# Patient Record
Sex: FEMALE | Race: OTHER | Employment: UNEMPLOYED | URBAN - METROPOLITAN AREA
[De-identification: names, ages, dates, MRNs, and addresses within clinical notes are randomized per-mention and may not be internally consistent; named-entity substitution may affect disease eponyms.]

---

## 2021-11-03 ENCOUNTER — HOSPITAL ENCOUNTER (OUTPATIENT)
Dept: LAB | Age: 41
Discharge: HOME | End: 2021-11-03

## 2021-11-03 ENCOUNTER — OFFICE VISIT (OUTPATIENT)
Dept: FAMILY MEDICINE CLINIC | Age: 41
End: 2021-11-03

## 2021-11-03 VITALS
BODY MASS INDEX: 52.14 KG/M2 | SYSTOLIC BLOOD PRESSURE: 133 MMHG | HEART RATE: 90 BPM | OXYGEN SATURATION: 98 % | WEIGHT: 265.6 LBS | TEMPERATURE: 98.4 F | DIASTOLIC BLOOD PRESSURE: 81 MMHG | RESPIRATION RATE: 17 BRPM | HEIGHT: 60 IN

## 2021-11-03 DIAGNOSIS — R20.2 NUMBNESS AND TINGLING: ICD-10-CM

## 2021-11-03 DIAGNOSIS — M25.561 CHRONIC PAIN OF BOTH KNEES: ICD-10-CM

## 2021-11-03 DIAGNOSIS — G89.29 CHRONIC PAIN OF BOTH KNEES: ICD-10-CM

## 2021-11-03 DIAGNOSIS — K21.9 GASTROESOPHAGEAL REFLUX DISEASE, UNSPECIFIED WHETHER ESOPHAGITIS PRESENT: ICD-10-CM

## 2021-11-03 DIAGNOSIS — R20.0 NUMBNESS AND TINGLING: ICD-10-CM

## 2021-11-03 DIAGNOSIS — R11.0 NAUSEA: Primary | ICD-10-CM

## 2021-11-03 DIAGNOSIS — R35.0 FREQUENCY OF URINATION: ICD-10-CM

## 2021-11-03 DIAGNOSIS — M25.562 CHRONIC PAIN OF BOTH KNEES: ICD-10-CM

## 2021-11-03 LAB
ALBUMIN SERPL-MCNC: 3.6 G/DL (ref 3.5–5)
ALBUMIN/GLOB SERPL: 0.8 {RATIO} (ref 1.1–2.2)
ALP SERPL-CCNC: 110 U/L (ref 45–117)
ALT SERPL-CCNC: 27 U/L (ref 12–78)
ANION GAP SERPL CALC-SCNC: 7 MMOL/L (ref 5–15)
AST SERPL-CCNC: 8 U/L (ref 15–37)
BILIRUB SERPL-MCNC: 0.3 MG/DL (ref 0.2–1)
BILIRUB UR QL STRIP: NEGATIVE
BUN SERPL-MCNC: 12 MG/DL (ref 6–20)
BUN/CREAT SERPL: 18 (ref 12–20)
CALCIUM SERPL-MCNC: 9.2 MG/DL (ref 8.5–10.1)
CHLORIDE SERPL-SCNC: 110 MMOL/L (ref 97–108)
CHOLEST SERPL-MCNC: 156 MG/DL
CO2 SERPL-SCNC: 25 MMOL/L (ref 21–32)
CREAT SERPL-MCNC: 0.68 MG/DL (ref 0.55–1.02)
EST. AVERAGE GLUCOSE BLD GHB EST-MCNC: 120 MG/DL
GLOBULIN SER CALC-MCNC: 4.3 G/DL (ref 2–4)
GLUCOSE SERPL-MCNC: 125 MG/DL (ref 65–100)
GLUCOSE UR-MCNC: NEGATIVE MG/DL
HBA1C MFR BLD: 5.8 % (ref 4–5.6)
HCG URINE, QL. (POC): NEGATIVE
HDLC SERPL-MCNC: 43 MG/DL
HDLC SERPL: 3.6 {RATIO} (ref 0–5)
KETONES P FAST UR STRIP-MCNC: NEGATIVE MG/DL
LDLC SERPL CALC-MCNC: 77.4 MG/DL (ref 0–100)
PH UR STRIP: 5.5 [PH] (ref 4.6–8)
POTASSIUM SERPL-SCNC: 4 MMOL/L (ref 3.5–5.1)
PROT SERPL-MCNC: 7.9 G/DL (ref 6.4–8.2)
PROT UR QL STRIP: NEGATIVE
SODIUM SERPL-SCNC: 142 MMOL/L (ref 136–145)
SP GR UR STRIP: 1.03 (ref 1–1.03)
TRIGL SERPL-MCNC: 178 MG/DL (ref ?–150)
TSH SERPL DL<=0.05 MIU/L-ACNC: 1.41 UIU/ML (ref 0.36–3.74)
UA UROBILINOGEN AMB POC: NORMAL (ref 0.2–1)
URINALYSIS CLARITY POC: CLEAR
URINALYSIS COLOR POC: YELLOW
URINE BLOOD POC: NORMAL
URINE LEUKOCYTES POC: NEGATIVE
URINE NITRITES POC: NEGATIVE
VALID INTERNAL CONTROL?: YES
VLDLC SERPL CALC-MCNC: 35.6 MG/DL

## 2021-11-03 PROCEDURE — 81001 URINALYSIS AUTO W/SCOPE: CPT | Performed by: FAMILY MEDICINE

## 2021-11-03 PROCEDURE — 84443 ASSAY THYROID STIM HORMONE: CPT

## 2021-11-03 PROCEDURE — 99203 OFFICE O/P NEW LOW 30 MIN: CPT | Performed by: FAMILY MEDICINE

## 2021-11-03 PROCEDURE — 83036 HEMOGLOBIN GLYCOSYLATED A1C: CPT

## 2021-11-03 PROCEDURE — 80061 LIPID PANEL: CPT

## 2021-11-03 PROCEDURE — 81025 URINE PREGNANCY TEST: CPT | Performed by: FAMILY MEDICINE

## 2021-11-03 PROCEDURE — 80053 COMPREHEN METABOLIC PANEL: CPT

## 2021-11-03 RX ORDER — ETONOGESTREL 68 MG/1
IMPLANT SUBCUTANEOUS
COMMUNITY

## 2021-11-03 RX ORDER — PANTOPRAZOLE SODIUM 40 MG/1
40 TABLET, DELAYED RELEASE ORAL DAILY
Qty: 30 TABLET | Refills: 1 | Status: SHIPPED | OUTPATIENT
Start: 2021-11-03 | End: 2022-06-23 | Stop reason: SDUPTHER

## 2021-11-03 RX ORDER — CELECOXIB 200 MG/1
200 CAPSULE ORAL 2 TIMES DAILY
Qty: 60 CAPSULE | Refills: 1 | Status: SHIPPED | OUTPATIENT
Start: 2021-11-03 | End: 2022-06-23

## 2021-11-03 NOTE — PROGRESS NOTES
An After Visit Summary was printed and given to the patient. GoodRx provided to patient for needed medications. Instructed patient on how to use the coupon/card. Patient did not want the Flu shot at this time. April 468 Sara Bolanos, RN

## 2021-11-03 NOTE — PROGRESS NOTES
Cliff Asif is a 38 y.o. female  Chief Complaint   Patient presents with   [employer] Establish Care    Numbness     on both hands    Nausea     Blood pressure 133/81, pulse 90, temperature 98.4 °F (36.9 °C), temperature source Temporal, resp. rate 17, height 4' 11.69\" (1.516 m), weight 265 lb 9.6 oz (120.5 kg), last menstrual period 09/21/2021, SpO2 98 %. 1. Have you been to the ER, urgent care clinic since your last visit? Hospitalized since your last visit? No    2. Have you seen or consulted any other health care providers outside of the [address] [place] [address] since your last visit? Include any pap smears or colon screening.  No

## 2021-11-03 NOTE — PROGRESS NOTES
HISTORY OF PRESENT ILLNESS  Mirlande Neri is a 38 y.o. female. HPI  Patient states her hands become numb, also her knees,  She has leg pains and can't climb stairs. Also has epigastric pain and nausea. In August she had gallbladder surgery,  It was an emergency surgery. She had a drain for 3 weeks. It eventualy healed. She is unaware if she has problems with sugar levels. She used to weight 240 last year. 265 now. She states she used depo provera and thinks this is the reason why she gain weight. She has some gastric reflux as well. Review of Systems   Constitutional: Negative for chills, malaise/fatigue and weight loss. Respiratory: Negative for cough, shortness of breath and wheezing. Cardiovascular: Negative for chest pain, palpitations and orthopnea. Gastrointestinal: Positive for abdominal pain and nausea. Genitourinary: Negative for dysuria, frequency and urgency. Musculoskeletal: Positive for joint pain. Physical Exam  Constitutional:       General: She is not in acute distress. HENT:      Head: Normocephalic. Right Ear: Tympanic membrane normal.      Left Ear: Tympanic membrane normal.   Cardiovascular:      Rate and Rhythm: Normal rate and regular rhythm. Pulses: Normal pulses. Heart sounds: Normal heart sounds. No murmur heard. Pulmonary:      Effort: Pulmonary effort is normal.      Breath sounds: Normal breath sounds. No wheezing, rhonchi or rales. Abdominal:      General: Bowel sounds are normal. There is no distension. Palpations: Abdomen is soft. Tenderness: There is no abdominal tenderness. Musculoskeletal:         General: Tenderness present. Cervical back: Normal range of motion. No rigidity. Comments: Bilateral anterior knee pain to palpation and ROM   Neurological:      Mental Status: She is alert. ASSESSMENT and PLAN  Diagnoses and all orders for this visit:    1.  Nausea  -     AMB POC URINE PREGNANCY TEST, VISUAL COLOR COMPARISON    2. Frequency of urination  -     AMB POC URINALYSIS DIP STICK AUTO W/ MICRO    3. Numbness and tingling  -     CBC WITH AUTOMATED DIFF; Future  -     METABOLIC PANEL, COMPREHENSIVE; Future  -     HEMOGLOBIN A1C WITH EAG; Future  -     LIPID PANEL; Future  -     TSH 3RD GENERATION; Future    4. Chronic pain of both knees  -     celecoxib (CELEBREX) 200 mg capsule; Take 1 Capsule by mouth two (2) times a day. 5. Gastroesophageal reflux disease, unspecified whether esophagitis present  -     pantoprazole (PROTONIX) 40 mg tablet; Take 1 Tablet by mouth daily. 38year old with numbness, tingling, joint pains, gastric reflux,  She also mentions she has gained over 15 pounds in the past year.   We will check labs today  Topical nsaid offered for knee pain, she prefers oral   GERD symptoms, we will write for as needed pantoprazole

## 2021-11-03 NOTE — PROGRESS NOTES
Results for orders placed or performed in visit on 11/03/21   AMB POC URINE PREGNANCY TEST, VISUAL COLOR COMPARISON   Result Value Ref Range    VALID INTERNAL CONTROL POC Yes     HCG urine, Ql. (POC) Negative Negative   AMB POC URINALYSIS DIP STICK AUTO W/ MICRO   Result Value Ref Range    Color (UA POC) Yellow     Clarity (UA POC) Clear     Glucose (UA POC) Negative Negative    Bilirubin (UA POC) Negative Negative    Ketones (UA POC) Negative Negative    Specific gravity (UA POC) 1.030 1.001 - 1.035    Blood (UA POC) Trace Negative    pH (UA POC) 5.5 4.6 - 8.0    Protein (UA POC) Negative Negative    Urobilinogen (UA POC) 0.2 mg/dL 0.2 - 1    Nitrites (UA POC) Negative Negative    Leukocyte esterase (UA POC) Negative Negative

## 2021-11-04 NOTE — PROGRESS NOTES
Labs show prediabetes,  She needs to eliminate sugars from her diet,  increase physical activity  Eat healthy  Cholesterol was normal, normal kidney function, liver function and electrolytes, normal thyroid  Patient can be informed

## 2021-11-26 NOTE — PROGRESS NOTES
CMA made t/c to patient to review lab results per provider instructions, name and  were verified with patient. CMA  informed patient that Labs show prediabetes,   She needs to eliminate sugars from her diet,  increase physical activity   Eat healthy   Cholesterol was normal, normal kidney function, liver function and electrolytes, normal thyroid   Patient can be informed. CMA reminded patient of upcoming appointment at [facility]. This has been fully explained to the patient, who indicates understanding.

## 2022-02-08 ENCOUNTER — OFFICE VISIT (OUTPATIENT)
Dept: FAMILY MEDICINE CLINIC | Age: 42
End: 2022-02-08

## 2022-02-08 VITALS
BODY MASS INDEX: 55.68 KG/M2 | HEIGHT: 59 IN | WEIGHT: 276.2 LBS | SYSTOLIC BLOOD PRESSURE: 136 MMHG | RESPIRATION RATE: 17 BRPM | HEART RATE: 94 BPM | OXYGEN SATURATION: 96 % | TEMPERATURE: 97.4 F | DIASTOLIC BLOOD PRESSURE: 96 MMHG

## 2022-02-08 DIAGNOSIS — R73.03 PREDIABETES: Primary | ICD-10-CM

## 2022-02-08 DIAGNOSIS — R03.0 ELEVATED BP WITHOUT DIAGNOSIS OF HYPERTENSION: ICD-10-CM

## 2022-02-08 LAB — GLUCOSE POC: NORMAL MG/DL

## 2022-02-08 PROCEDURE — 99213 OFFICE O/P EST LOW 20 MIN: CPT | Performed by: FAMILY MEDICINE

## 2022-02-08 PROCEDURE — 82962 GLUCOSE BLOOD TEST: CPT | Performed by: FAMILY MEDICINE

## 2022-02-08 NOTE — PROGRESS NOTES
Choco Arevalo is a 42 y.o. female  Chief Complaint   Patient presents with    Diabetes    Fatigue     and SOB. Blood pressure (!) 136/96, pulse 94, temperature 97.4 °F (36.3 °C), temperature source Temporal, resp. rate 17, height 4' 11\" (1.499 m), weight 276 lb 3.2 oz (125.3 kg), SpO2 96 %. 1. Have you been to the ER, urgent care clinic since your last visit? Hospitalized since your last visit? No    2. Have you seen or consulted any other health care providers outside of the [address] [place] [address] since your last visit? Include any pap smears or colon screening.  No   Results for orders placed or performed in visit on 02/08/22   AMB POC GLUCOSE BLOOD, BY GLUCOSE MONITORING DEVICE   Result Value Ref Range    Glucose -NF MG/DL

## 2022-02-08 NOTE — PROGRESS NOTES
Félix Holder is a 42 y.o. female   Chief Complaint   Patient presents with    Diabetes    Fatigue     and SOB. ASSESSMENT AND PLAN:    1. Prediabetes  NFBG 147 today. Pt has not made dietary changes. She will renew her efforts. Will follow up in 6 weeks and check A1C then. Do not expect to see a significant change in A1C if we were to check again today. - AMB POC GLUCOSE BLOOD, BY GLUCOSE MONITORING DEVICE    2. Elevated BP. Discussed with patient. Another reason to implement lifestyle modifications. Patient is in agreement. F/U in 6 weeks. If elevated, will consider starting medication. SUBJECTIVE:    HPI:  Anna Zhao is a 42 y.o. female who presents for follow up on prediabetes. She was planning on changing her diet and starting to implement more physical activity but she has not. She states that she had a nexplanon placed to prevent pregnancy and it has increased her appetite and all she wants to do is eat. She would like surgery to prevent pregnancy because she has had similar issues and more with the pill and depo. Also the nexplanon has made her tired and emotional.   She has made one change, though-- she has stopped putting sugar in her coffee. Review of Systems   Constitutional: Negative for fever and malaise/fatigue. Eyes: Negative for blurred vision. Respiratory: Negative for cough and shortness of breath. Cardiovascular: Negative for chest pain, palpitations and leg swelling. Gastrointestinal: Negative for abdominal pain, constipation, diarrhea, nausea and vomiting. Neurological: Negative for dizziness and headaches.          BP (!) 136/96 (BP 1 Location: Right upper arm, BP Patient Position: Sitting, BP Cuff Size: Adult long)   Pulse 94   Temp 97.4 °F (36.3 °C) (Temporal)   Resp 17   Ht 4' 11\" (1.499 m)   Wt 276 lb 3.2 oz (125.3 kg)   SpO2 96%   BMI 55.79 kg/m²     Physical Exam  Constitutional:       General: She is not in acute distress. Appearance: Normal appearance. Cardiovascular:      Rate and Rhythm: Normal rate and regular rhythm. Pulses: Normal pulses. Heart sounds: Normal heart sounds. Pulmonary:      Effort: Pulmonary effort is normal.      Breath sounds: Normal breath sounds. Abdominal:      General: Bowel sounds are normal. There is no distension. Palpations: Abdomen is soft. Tenderness: There is no abdominal tenderness. Musculoskeletal:      Cervical back: No tenderness. Lymphadenopathy:      Cervical: No cervical adenopathy. Neurological:      Mental Status: She is alert and oriented to person, place, and time.       Gait: Gait normal.      Deep Tendon Reflexes: Reflexes normal.   Psychiatric:         Behavior: Behavior normal.

## 2022-06-23 ENCOUNTER — HOSPITAL ENCOUNTER (OUTPATIENT)
Dept: LAB | Age: 42
Discharge: HOME | End: 2022-06-23

## 2022-06-23 ENCOUNTER — OFFICE VISIT (OUTPATIENT)
Dept: FAMILY MEDICINE CLINIC | Age: 42
End: 2022-06-23

## 2022-06-23 VITALS
HEART RATE: 77 BPM | WEIGHT: 288 LBS | OXYGEN SATURATION: 95 % | BODY MASS INDEX: 58.06 KG/M2 | TEMPERATURE: 97.5 F | SYSTOLIC BLOOD PRESSURE: 159 MMHG | HEIGHT: 59 IN | DIASTOLIC BLOOD PRESSURE: 107 MMHG

## 2022-06-23 DIAGNOSIS — K21.9 GASTROESOPHAGEAL REFLUX DISEASE, UNSPECIFIED WHETHER ESOPHAGITIS PRESENT: ICD-10-CM

## 2022-06-23 DIAGNOSIS — Z23 ENCOUNTER FOR IMMUNIZATION: Primary | ICD-10-CM

## 2022-06-23 DIAGNOSIS — R73.03 PREDIABETES: ICD-10-CM

## 2022-06-23 DIAGNOSIS — I10 HYPERTENSION, UNSPECIFIED TYPE: ICD-10-CM

## 2022-06-23 LAB
ALBUMIN SERPL-MCNC: 4.1 G/DL (ref 3.5–5)
ALBUMIN/GLOB SERPL: 1 {RATIO} (ref 1.1–2.2)
ALP SERPL-CCNC: 128 U/L (ref 45–117)
ALT SERPL-CCNC: 37 U/L (ref 12–78)
ANION GAP SERPL CALC-SCNC: 5 MMOL/L (ref 5–15)
AST SERPL-CCNC: 15 U/L (ref 15–37)
BASOPHILS # BLD: 0.1 K/UL (ref 0–0.1)
BASOPHILS NFR BLD: 1 % (ref 0–1)
BILIRUB SERPL-MCNC: 0.3 MG/DL (ref 0.2–1)
BUN SERPL-MCNC: 14 MG/DL (ref 6–20)
BUN/CREAT SERPL: 19 (ref 12–20)
CALCIUM SERPL-MCNC: 9.4 MG/DL (ref 8.5–10.1)
CHLORIDE SERPL-SCNC: 108 MMOL/L (ref 97–108)
CO2 SERPL-SCNC: 29 MMOL/L (ref 21–32)
CREAT SERPL-MCNC: 0.73 MG/DL (ref 0.55–1.02)
DIFFERENTIAL METHOD BLD: ABNORMAL
EOSINOPHIL # BLD: 0.2 K/UL (ref 0–0.4)
EOSINOPHIL NFR BLD: 2 % (ref 0–7)
ERYTHROCYTE [DISTWIDTH] IN BLOOD BY AUTOMATED COUNT: 14.1 % (ref 11.5–14.5)
EST. AVERAGE GLUCOSE BLD GHB EST-MCNC: 123 MG/DL
GLOBULIN SER CALC-MCNC: 4 G/DL (ref 2–4)
GLUCOSE SERPL-MCNC: 83 MG/DL (ref 65–100)
HBA1C MFR BLD: 5.9 % (ref 4–5.6)
HCT VFR BLD AUTO: 44.9 % (ref 35–47)
HGB BLD-MCNC: 14.2 G/DL (ref 11.5–16)
IMM GRANULOCYTES # BLD AUTO: 0 K/UL (ref 0–0.04)
IMM GRANULOCYTES NFR BLD AUTO: 0 % (ref 0–0.5)
LYMPHOCYTES # BLD: 4.1 K/UL (ref 0.8–3.5)
LYMPHOCYTES NFR BLD: 37 % (ref 12–49)
MCH RBC QN AUTO: 29.2 PG (ref 26–34)
MCHC RBC AUTO-ENTMCNC: 31.6 G/DL (ref 30–36.5)
MCV RBC AUTO: 92.2 FL (ref 80–99)
MONOCYTES # BLD: 0.7 K/UL (ref 0–1)
MONOCYTES NFR BLD: 6 % (ref 5–13)
NEUTS SEG # BLD: 6 K/UL (ref 1.8–8)
NEUTS SEG NFR BLD: 54 % (ref 32–75)
NRBC # BLD: 0 K/UL (ref 0–0.01)
NRBC BLD-RTO: 0 PER 100 WBC
PLATELET # BLD AUTO: 260 K/UL (ref 150–400)
PMV BLD AUTO: 10.5 FL (ref 8.9–12.9)
POTASSIUM SERPL-SCNC: 4.1 MMOL/L (ref 3.5–5.1)
PROT SERPL-MCNC: 8.1 G/DL (ref 6.4–8.2)
RBC # BLD AUTO: 4.87 M/UL (ref 3.8–5.2)
SODIUM SERPL-SCNC: 142 MMOL/L (ref 136–145)
WBC # BLD AUTO: 11 K/UL (ref 3.6–11)

## 2022-06-23 PROCEDURE — 99213 OFFICE O/P EST LOW 20 MIN: CPT | Performed by: FAMILY MEDICINE

## 2022-06-23 PROCEDURE — 0054A COVID-19, PFIZER GRAY TOP, DO NOT DILUTE, TRIS-SUCROSE, (AGE 12 YRS+), PF, 30MCG/0.3 ML: CPT

## 2022-06-23 PROCEDURE — 83036 HEMOGLOBIN GLYCOSYLATED A1C: CPT

## 2022-06-23 PROCEDURE — 80053 COMPREHEN METABOLIC PANEL: CPT

## 2022-06-23 PROCEDURE — 85025 COMPLETE CBC W/AUTO DIFF WBC: CPT

## 2022-06-23 PROCEDURE — 91305 COVID-19, PFIZER GRAY TOP, DO NOT DILUTE, TRIS-SUCROSE, (AGE 12 YRS+), PF, 30MCG/0.3 ML: CPT

## 2022-06-23 RX ORDER — PANTOPRAZOLE SODIUM 40 MG/1
40 TABLET, DELAYED RELEASE ORAL DAILY
Qty: 90 TABLET | Refills: 2 | Status: SHIPPED | OUTPATIENT
Start: 2022-06-23

## 2022-06-23 RX ORDER — HYDROCHLOROTHIAZIDE 12.5 MG/1
12.5 TABLET ORAL DAILY
Qty: 90 TABLET | Refills: 2 | Status: SHIPPED | OUTPATIENT
Start: 2022-06-23

## 2022-06-23 NOTE — PROGRESS NOTES
An After Visit Summary was printed and given to the patient. GoodRx provided to patient for needed medications. Instructed patient on how to use the coupon/card. Covid-19 vaccine was administered with consent. VIS was given before administration. Patient waited 15 minutes after administration. No reaction noted.

## 2022-06-23 NOTE — PROGRESS NOTES
HISTORY OF PRESENT ILLNESS  Jenifer Montgomery is a 42 y.o. female. HPI  Patient states she had her gallbladder removed last year. She has noticed she is waking up in the middle of the night with some reflux, has noticed some blood in the sputum along with phlegm. She was told she was prediabetic the last time she was seen. She has tried to avoid soda. Review of Systems   Constitutional: Negative for chills, malaise/fatigue and weight loss. Respiratory: Negative for cough, shortness of breath and wheezing. Cardiovascular: Negative for chest pain, palpitations and orthopnea. Gastrointestinal: Positive for heartburn. Negative for abdominal pain and nausea. Genitourinary: Negative for dysuria, frequency and urgency. Musculoskeletal: Negative for joint pain. BP (!) 159/107 (BP 1 Location: Left upper arm, BP Patient Position: Sitting)   Pulse 77   Temp 97.5 °F (36.4 °C) (Temporal)   Ht 4' 11.02\" (1.499 m)   Wt 288 lb (130.6 kg)   SpO2 95%   BMI 58.14 kg/m²   Physical Exam  Constitutional:       General: She is not in acute distress. HENT:      Head: Normocephalic. Right Ear: Tympanic membrane normal.      Left Ear: Tympanic membrane normal.   Cardiovascular:      Rate and Rhythm: Normal rate and regular rhythm. Pulses: Normal pulses. Heart sounds: Normal heart sounds. No murmur heard. Pulmonary:      Effort: Pulmonary effort is normal.      Breath sounds: Normal breath sounds. No wheezing, rhonchi or rales. Abdominal:      General: Bowel sounds are normal. There is no distension. Palpations: Abdomen is soft. Tenderness: There is no abdominal tenderness. Musculoskeletal:         General: No tenderness. Cervical back: Normal range of motion. No rigidity. Neurological:      Mental Status: She is alert. ASSESSMENT and PLAN  Diagnoses and all orders for this visit:    1.  Hypertension, unspecified type  -     hydroCHLOROthiazide (HYDRODIURIL) 12.5 mg tablet; Take 1 Tablet by mouth daily. 2. Gastroesophageal reflux disease, unspecified whether esophagitis present  -     pantoprazole (PROTONIX) 40 mg tablet; Take 1 Tablet by mouth daily.  -     CBC WITH AUTOMATED DIFF; Future  -     METABOLIC PANEL, COMPREHENSIVE; Future    3. Prediabetes  -     HEMOGLOBIN A1C WITH EAG;  Future      42year old female with hypertension, and GERD symptoms  Prior labs from last year showed prediabetes  We will check labs today  Start hctz  Pantoprazole for GERD  Follow up in 1 month

## 2022-06-23 NOTE — PROGRESS NOTES
Coordination of Care  1. Have you been to the ER, urgent care clinic since your last visit? Hospitalized since your last visit? No    2. Have you seen or consulted any other health care providers outside of the [address] [place] [address] since your last visit? Include any pap smears or colon screening. No    Does the patient need refills? NO    Learning Assessment Complete?  yes  Depression Screening complete in the past 12 months? yes

## 2022-06-27 NOTE — PROGRESS NOTES
Hemoglobin a1c remains at risk of diabetes at 5.9%  Normal kidney function, liver function, electrolytes, blood count  Patient can be informed

## 2022-07-11 NOTE — PROGRESS NOTES
Attempted to call the patient several times. No answer.  RAUL printed and mailed a lab results letters to the patient's mailing address on file

## 2022-07-13 ENCOUNTER — TELEPHONE (OUTPATIENT)
Dept: FAMILY MEDICINE CLINIC | Age: 42
End: 2022-07-13

## 2022-07-13 NOTE — TELEPHONE ENCOUNTER
Tc to the pt to return her call. She had stated she had several missed calls from the [facility]. The pt had previously been called by the [facility] to give her the lab results. She did not answer so the pt was mailed the results in the mail. The pt was called back. She answered. AMN Int # [id number]. The pt verified her name and . The pt was given her lab results message from the provider and I reviewed ways to decrease the Hgb A1c with diet and exercise. The pt verbalized understanding. She was told she would be receiving the lab results message in the mail with the providers message on it to her.  Deandre Stone RN

## 2022-07-13 NOTE — TELEPHONE ENCOUNTER
Patient called the main office back. States she had some missed calls from the [address] [place] [address]  It looks like based on some notes they called her for lab results.

## 2022-08-25 ENCOUNTER — OFFICE VISIT (OUTPATIENT)
Dept: FAMILY MEDICINE CLINIC | Age: 42
End: 2022-08-25

## 2022-08-25 VITALS
DIASTOLIC BLOOD PRESSURE: 78 MMHG | TEMPERATURE: 98.2 F | OXYGEN SATURATION: 97 % | HEART RATE: 90 BPM | BODY MASS INDEX: 58.87 KG/M2 | HEIGHT: 59 IN | SYSTOLIC BLOOD PRESSURE: 122 MMHG | WEIGHT: 292 LBS

## 2022-08-25 DIAGNOSIS — K29.51 CHRONIC GASTRITIS WITH BLEEDING, UNSPECIFIED GASTRITIS TYPE: Primary | ICD-10-CM

## 2022-08-25 DIAGNOSIS — M25.561 CHRONIC PAIN OF BOTH KNEES: ICD-10-CM

## 2022-08-25 DIAGNOSIS — I10 HYPERTENSION, UNSPECIFIED TYPE: ICD-10-CM

## 2022-08-25 DIAGNOSIS — M25.562 CHRONIC PAIN OF BOTH KNEES: ICD-10-CM

## 2022-08-25 DIAGNOSIS — G89.29 CHRONIC PAIN OF BOTH KNEES: ICD-10-CM

## 2022-08-25 PROCEDURE — 99213 OFFICE O/P EST LOW 20 MIN: CPT | Performed by: FAMILY MEDICINE

## 2022-08-25 RX ORDER — DICLOFENAC SODIUM 10 MG/G
4 GEL TOPICAL 3 TIMES DAILY
Qty: 100 G | Refills: 3 | Status: SHIPPED | OUTPATIENT
Start: 2022-08-25

## 2022-08-25 NOTE — PROGRESS NOTES
HISTORY OF PRESENT ILLNESS  Carlos Matos is a 42 y.o. female. HPI  Patient states she continues to have abdominal pain. She states she has blood in the sputum. She states she has abdominal pains. States she wakes up in the middle of the night and presents with blood in the sputum. Patient states she has had problems with her knee joints for about 1 year,  it has gotten worse lately. Review of Systems   Constitutional:  Negative for chills, malaise/fatigue and weight loss. Respiratory:  Negative for cough, shortness of breath and wheezing. Cardiovascular:  Negative for chest pain, palpitations and orthopnea. Gastrointestinal:  Positive for heartburn. Negative for abdominal pain and nausea. Blood in sputum   Genitourinary:  Negative for dysuria, frequency and urgency. Musculoskeletal:  Positive for joint pain. Bilateral knee joint pain   /78 (BP 1 Location: Left upper arm, BP Patient Position: Sitting)   Pulse 90   Temp 98.2 °F (36.8 °C) (Temporal)   Ht 4' 11.02\" (1.499 m)   Wt 292 lb (132.5 kg)   LMP 08/23/2022   SpO2 97%   BMI 58.94 kg/m²   Physical Exam  Constitutional:       General: She is not in acute distress. HENT:      Head: Normocephalic. Right Ear: Tympanic membrane normal.      Left Ear: Tympanic membrane normal.   Cardiovascular:      Rate and Rhythm: Normal rate and regular rhythm. Pulses: Normal pulses. Heart sounds: Normal heart sounds. No murmur heard. Pulmonary:      Effort: Pulmonary effort is normal.      Breath sounds: Normal breath sounds. No wheezing, rhonchi or rales. Abdominal:      General: Bowel sounds are normal. There is no distension. Palpations: Abdomen is soft. Tenderness: There is no abdominal tenderness. Musculoskeletal:         General: Tenderness and deformity present. Cervical back: Normal range of motion. No rigidity.       Comments: Bilateral knee joint pain to ROM   Neurological: Mental Status: She is alert. ASSESSMENT and PLAN  Diagnoses and all orders for this visit:    1. Chronic gastritis with bleeding, unspecified gastritis type  -     REFERRAL TO GASTROENTEROLOGY  -     H PYLORI AG, STOOL; Future    2. Hypertension, unspecified type    3. Chronic pain of both knees  -     diclofenac (VOLTAREN) 1 % gel; Apply 4 g to affected area three (3) times daily. Both knees  -     XR KNEE RT MAX 2 VWS; Future  -     XR KNEE LT MAX 2 VWS; Future    42year old female with chronic gastritis, we will refer to GI, check h.  Pylori  Chronic knee joint pain,  we will add voltaren gel,  weight loss recommended, will get x rays of knees  Follow up in 2 weeks for possible joint injection

## 2022-08-25 NOTE — PROGRESS NOTES
I have printed AVS and reviewed it with patient today. Patient verbalized understanding. .I reviewed with patient medications sent to pharmacy and how the medication is taken. Patient verbalized understanding. The patient was texted coupons for prescriptions and I explained how the coupons are used. Patient verbalized understanding. I explained procedure for obtaining xray as a walk-in and instructed the patient to go to Outpatient Registration. The patient selected [address] [place] [address].I provided address and directions to facility. I explained to the patient that someone will call them after the results are available. Patient verbalized understanding. I explained the process for Access Now to the patient and explained to the patient that she will receive a call to start the Access Now process. I instructed patient to schedule a follow-up appointment prior to leaving today. I explained the process for obtaining the HPylori test and gave the patient the container. I explained to the patient to return the container within 24 hours and to refrigerate the specimen after collecting. Patient verbalized understanding. Patient correctly stated her full name and date of birth prior to the information shared.  [place] with the [place] [id number] assisted with this discharge.   Doe Reeves RN

## 2022-08-25 NOTE — PROGRESS NOTES
Elvira Benites  1980  Chief Complaint   Patient presents with    Hypertension     And GI problem f/up    Knee Pain     Pt c/o bilateral knee pain. Pt took 3 Advil today     Visit Vitals  /78 (BP 1 Location: Left upper arm, BP Patient Position: Sitting)   Pulse 90   Temp 98.2 °F (36.8 °C) (Temporal)   Ht 4' 11.02\" (1.499 m)   Wt 292 lb (132.5 kg)   SpO2 97%   BMI 58.94 kg/m²     Coordination of Care  1. Have you been to the ER, urgent care clinic since your last visit? Hospitalized since your last visit? No    2. Have you seen or consulted any other health care providers outside of the [address] [place] [address] since your last visit? Include any pap smears or colon screening. No    Does the patient need refills? NO    Learning Assessment Complete?  yes  Depression Screening complete in the past 12 months? yes

## 2022-09-12 ENCOUNTER — TELEPHONE (OUTPATIENT)
Dept: FAMILY MEDICINE CLINIC | Age: 42
End: 2022-09-12

## 2022-09-27 ENCOUNTER — OFFICE VISIT (OUTPATIENT)
Dept: FAMILY MEDICINE CLINIC | Age: 42
End: 2022-09-27

## 2022-09-27 DIAGNOSIS — Z71.89 COUNSELING AND COORDINATION OF CARE: Primary | ICD-10-CM

## 2022-09-27 PROCEDURE — 99080 SPECIAL REPORTS OR FORMS: CPT | Performed by: FAMILY MEDICINE

## 2022-09-27 NOTE — PROGRESS NOTES
AN financial screening is incomplete. Patient has been re-scheduled for 10/4/22 to bring pending documents.

## 2022-09-29 ENCOUNTER — TELEPHONE (OUTPATIENT)
Dept: FAMILY MEDICINE CLINIC | Age: 42
End: 2022-09-29

## 2022-09-30 ENCOUNTER — TELEPHONE (OUTPATIENT)
Dept: FAMILY MEDICINE CLINIC | Age: 42
End: 2022-09-30

## 2022-09-30 NOTE — TELEPHONE ENCOUNTER
9/30/22-I tried to reach patient twice, no answer. I also sent her a text message regarding appointment for 10/3/22., needs to be re-schedule for next available at [facility].

## 2022-10-04 ENCOUNTER — OFFICE VISIT (OUTPATIENT)
Dept: FAMILY MEDICINE CLINIC | Age: 42
End: 2022-10-04

## 2022-10-04 ENCOUNTER — PATIENT OUTREACH (OUTPATIENT)
Dept: FAMILY MEDICINE CLINIC | Age: 42
End: 2022-10-04

## 2022-10-04 DIAGNOSIS — Z71.89 COUNSELING AND COORDINATION OF CARE: Primary | ICD-10-CM

## 2022-10-04 PROCEDURE — 99080 SPECIAL REPORTS OR FORMS: CPT | Performed by: FAMILY MEDICINE

## 2022-10-04 NOTE — PROGRESS NOTES
AN financial screening for is complete. Patient has been instructed to call appointment line on or after 10/18/22. Patient has been screened for [facility]. Patient has requested Food (score 3). Resources have been sent via text as requested by patient.

## 2022-10-07 ENCOUNTER — HOSPITAL ENCOUNTER (OUTPATIENT)
Dept: GENERAL RADIOLOGY | Age: 42
Discharge: HOME | End: 2022-10-07

## 2022-10-07 DIAGNOSIS — M25.562 CHRONIC PAIN OF BOTH KNEES: ICD-10-CM

## 2022-10-07 DIAGNOSIS — G89.29 CHRONIC PAIN OF BOTH KNEES: ICD-10-CM

## 2022-10-07 DIAGNOSIS — M25.561 CHRONIC PAIN OF BOTH KNEES: ICD-10-CM

## 2022-10-07 PROCEDURE — 73560 X-RAY EXAM OF KNEE 1 OR 2: CPT

## 2022-10-22 ENCOUNTER — TELEPHONE (OUTPATIENT)
Dept: FAMILY MEDICINE CLINIC | Age: 42
End: 2022-10-22

## 2022-10-22 NOTE — TELEPHONE ENCOUNTER
T/c to patient to request additional information requested from AN program. Patient has been scheduled for [facility] on Monday 10/24/22. Patient is aware her  needs to come with her. Patient verbalized understanding.

## 2022-10-24 ENCOUNTER — OFFICE VISIT (OUTPATIENT)
Dept: FAMILY MEDICINE CLINIC | Age: 42
End: 2022-10-24

## 2022-10-24 VITALS
SYSTOLIC BLOOD PRESSURE: 132 MMHG | DIASTOLIC BLOOD PRESSURE: 74 MMHG | HEART RATE: 82 BPM | BODY MASS INDEX: 59.15 KG/M2 | WEIGHT: 293 LBS | TEMPERATURE: 98.1 F | OXYGEN SATURATION: 98 %

## 2022-10-24 DIAGNOSIS — M17.0 PRIMARY OSTEOARTHRITIS OF BOTH KNEES: Primary | ICD-10-CM

## 2022-10-24 DIAGNOSIS — Z71.89 COUNSELING AND COORDINATION OF CARE: Primary | ICD-10-CM

## 2022-10-24 PROCEDURE — 99080 SPECIAL REPORTS OR FORMS: CPT | Performed by: FAMILY MEDICINE

## 2022-10-24 PROCEDURE — 99213 OFFICE O/P EST LOW 20 MIN: CPT | Performed by: FAMILY MEDICINE

## 2022-10-24 PROCEDURE — 96372 THER/PROPH/DIAG INJ SC/IM: CPT

## 2022-10-24 RX ORDER — TRIAMCINOLONE ACETONIDE 40 MG/ML
40 INJECTION, SUSPENSION INTRA-ARTICULAR; INTRAMUSCULAR ONCE
Status: COMPLETED | OUTPATIENT
Start: 2022-10-24 | End: 2022-10-24

## 2022-10-24 RX ADMIN — TRIAMCINOLONE ACETONIDE 40 MG: 40 INJECTION, SUSPENSION INTRA-ARTICULAR; INTRAMUSCULAR at 11:28

## 2022-10-24 RX ADMIN — TRIAMCINOLONE ACETONIDE 40 MG: 40 INJECTION, SUSPENSION INTRA-ARTICULAR; INTRAMUSCULAR at 11:26

## 2022-10-24 NOTE — PROGRESS NOTES
Emmett Chong seen at d/c, full name and  verified. After Visit Summary provided and reviewed along with discharge instructions and when it is recommended to come back. Advised patient to schedule follow up appointment before she leaves today. Reviewed medication list with the patient to ensure she knows how to and when to take her medications. Side effects, mechanisms of action and medication compliance were reiterated to ensure proper understanding. Time for questions and answers provided, patient verbalizes understanding.

## 2022-10-24 NOTE — PROGRESS NOTES
HISTORY OF PRESENT ILLNESS  Eleazar Victor is a 42 y.o. female. HPI  Bilateral knee pain/arthritis, here for joint injection  ROS  /74   Pulse 82   Temp 98.1 °F (36.7 °C) (Temporal)   Wt 293 lb (132.9 kg)   SpO2 98%   BMI 59.15 kg/m²   Physical Exam  Constitutional:       General: She is not in acute distress. HENT:      Head: Normocephalic. Right Ear: Tympanic membrane normal.      Left Ear: Tympanic membrane normal.   Cardiovascular:      Rate and Rhythm: Normal rate and regular rhythm. Pulses: Normal pulses. Heart sounds: Normal heart sounds. No murmur heard. Pulmonary:      Effort: Pulmonary effort is normal.      Breath sounds: Normal breath sounds. No wheezing, rhonchi or rales. Abdominal:      General: Bowel sounds are normal. There is no distension. Palpations: Abdomen is soft. Tenderness: There is no abdominal tenderness. Musculoskeletal:         General: Tenderness and deformity present. Cervical back: Normal range of motion. No rigidity. Comments: Bilateral knee joint pain to ROM   Neurological:      Mental Status: She is alert. ASSESSMENT and PLAN  Diagnoses and all orders for this visit:    1. Primary osteoarthritis of both knees  -     triamcinolone acetonide (KENALOG-40) 40 mg/mL injection 40 mg  -     triamcinolone acetonide (KENALOG-40) 40 mg/mL injection 40 mg      Procedure:  After consent was obtained, using sterile technique the knee joints were prepped using Chlorprep. L  Kenalog 40 mg was mixed with 1% lidocaine 2 ml  and injected into each knee joint and the needle withdrawn. The procedure was well tolerated. The patient is asked to continue to rest the joint for a few more days before resuming regular activities. It may be more painful for the first 1-2 days. Watch for fever, or increased swelling or persistent pain in the joint.  Call or return to clinic prn if such symptoms occur or there is failure to improve as anticipated.

## 2022-11-30 ENCOUNTER — TELEPHONE (OUTPATIENT)
Dept: FAMILY MEDICINE CLINIC | Age: 42
End: 2022-11-30

## 2022-11-30 NOTE — TELEPHONE ENCOUNTER
OW called patient to f/up on SDOH and was unable to speak with patient. Left a vm asking to call OW back.

## 2022-12-13 ENCOUNTER — TELEPHONE (OUTPATIENT)
Dept: FAMILY MEDICINE CLINIC | Age: 42
End: 2022-12-13